# Patient Record
Sex: FEMALE | Race: WHITE | URBAN - METROPOLITAN AREA
[De-identification: names, ages, dates, MRNs, and addresses within clinical notes are randomized per-mention and may not be internally consistent; named-entity substitution may affect disease eponyms.]

---

## 2022-10-18 ENCOUNTER — OFFICE VISIT (OUTPATIENT)
Dept: URBAN - METROPOLITAN AREA CLINIC 24 | Facility: CLINIC | Age: 82
End: 2022-10-18
Payer: MEDICARE

## 2022-10-18 DIAGNOSIS — H16.223 KERATOCONJUNCTIVITIS SICCA, BILATERAL: Primary | ICD-10-CM

## 2022-10-18 DIAGNOSIS — H43.393 OTHER VITREOUS OPACITIES, BILATERAL: ICD-10-CM

## 2022-10-18 DIAGNOSIS — H15.833 STAPHYLOMA POSTICUM, BILATERAL: ICD-10-CM

## 2022-10-18 DIAGNOSIS — Z96.1 PRESENCE OF INTRAOCULAR LENS: ICD-10-CM

## 2022-10-18 PROCEDURE — 99203 OFFICE O/P NEW LOW 30 MIN: CPT | Performed by: STUDENT IN AN ORGANIZED HEALTH CARE EDUCATION/TRAINING PROGRAM

## 2022-10-18 PROCEDURE — 92134 CPTRZ OPH DX IMG PST SGM RTA: CPT | Performed by: STUDENT IN AN ORGANIZED HEALTH CARE EDUCATION/TRAINING PROGRAM

## 2022-10-18 ASSESSMENT — KERATOMETRY
OD: 47.16
OS: 47.18

## 2022-10-18 ASSESSMENT — INTRAOCULAR PRESSURE
OD: 13
OS: 14

## 2022-10-18 ASSESSMENT — VISUAL ACUITY
OD: 20/20
OS: 20/20

## 2022-10-18 NOTE — IMPRESSION/PLAN
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 Plan: Dry eyes contribute to the patient's complaints. There is no evidence of permanent changes to the cornea. Explained condition does not have a cure, is a chronic condition and will need consistent artificial tears use for maintenance. Cont pf art tears qid prn OU. Discussed option to add restasis/xiidra however pt deferred at this time. Can also consider punctal plugs.  Pt content with using frequent gtts throughout the day, just reports need to take more breaks from reading & tv.

## 2022-10-18 NOTE — IMPRESSION/PLAN
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 Plan: Prev high myope prior to cat sx. OCT mac today consistent with history. Retina intact, no cnvm, break/tears/detachment OU Monitor.

## 2023-11-30 ENCOUNTER — OFFICE VISIT (OUTPATIENT)
Dept: URBAN - METROPOLITAN AREA CLINIC 24 | Facility: CLINIC | Age: 83
End: 2023-11-30
Payer: MEDICARE

## 2023-11-30 DIAGNOSIS — H26.493 OTHER SECONDARY CATARACT, BILATERAL: ICD-10-CM

## 2023-11-30 DIAGNOSIS — H52.4 PRESBYOPIA: ICD-10-CM

## 2023-11-30 DIAGNOSIS — H15.833 STAPHYLOMA POSTICUM, BILATERAL: ICD-10-CM

## 2023-11-30 DIAGNOSIS — H16.223 KERATOCONJUNCTIVITIS SICCA, BILATERAL: Primary | ICD-10-CM

## 2023-11-30 PROCEDURE — 92014 COMPRE OPH EXAM EST PT 1/>: CPT | Performed by: STUDENT IN AN ORGANIZED HEALTH CARE EDUCATION/TRAINING PROGRAM

## 2023-11-30 PROCEDURE — 92134 CPTRZ OPH DX IMG PST SGM RTA: CPT | Performed by: STUDENT IN AN ORGANIZED HEALTH CARE EDUCATION/TRAINING PROGRAM

## 2023-11-30 ASSESSMENT — KERATOMETRY
OS: 47.17
OD: 47.10

## 2023-11-30 ASSESSMENT — VISUAL ACUITY
OS: 20/20
OD: 20/20

## 2023-11-30 ASSESSMENT — INTRAOCULAR PRESSURE
OD: 17
OS: 17

## 2024-12-04 ENCOUNTER — OFFICE VISIT (OUTPATIENT)
Dept: URBAN - METROPOLITAN AREA CLINIC 24 | Facility: CLINIC | Age: 84
End: 2024-12-04
Payer: MEDICARE

## 2024-12-04 DIAGNOSIS — H44.23 DEGENERATIVE MYOPIA, BILATERAL: ICD-10-CM

## 2024-12-04 DIAGNOSIS — H26.493 OTHER SECONDARY CATARACT, BILATERAL: Primary | ICD-10-CM

## 2024-12-04 DIAGNOSIS — H16.223 KERATOCONJUNCTIVITIS SICCA, BILATERAL: ICD-10-CM

## 2024-12-04 DIAGNOSIS — H15.833 STAPHYLOMA POSTICUM, BILATERAL: ICD-10-CM

## 2024-12-04 PROCEDURE — 92134 CPTRZ OPH DX IMG PST SGM RTA: CPT | Performed by: STUDENT IN AN ORGANIZED HEALTH CARE EDUCATION/TRAINING PROGRAM

## 2024-12-04 PROCEDURE — 99214 OFFICE O/P EST MOD 30 MIN: CPT | Performed by: STUDENT IN AN ORGANIZED HEALTH CARE EDUCATION/TRAINING PROGRAM

## 2024-12-04 RX ORDER — CYCLOSPORINE 0.5 MG/ML
0.05 % EMULSION OPHTHALMIC
Qty: 180 | Refills: 3 | Status: INACTIVE
Start: 2024-12-04 | End: 2025-03-03

## 2024-12-04 ASSESSMENT — INTRAOCULAR PRESSURE
OS: 14
OD: 16

## 2024-12-04 ASSESSMENT — VISUAL ACUITY
OS: 20/25
OD: 20/25